# Patient Record
Sex: FEMALE | Race: BLACK OR AFRICAN AMERICAN | NOT HISPANIC OR LATINO | URBAN - METROPOLITAN AREA
[De-identification: names, ages, dates, MRNs, and addresses within clinical notes are randomized per-mention and may not be internally consistent; named-entity substitution may affect disease eponyms.]

---
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
## 2021-05-12 NOTE — ED PROVIDER NOTE - PMH
Mild intermittent asthma, unspecified whether complicated Consent 1/Introductory Paragraph: The rationale for Mohs was explained to the patient and consent was obtained. The risks, benefits and alternatives to therapy were discussed in detail. Specifically, the risks of infection, scarring, bleeding, prolonged wound healing, incomplete removal, allergy to anesthesia, nerve injury and recurrence were addressed. Prior to the procedure, the treatment site was clearly identified and confirmed by the patient. All components of Universal Protocol/PAUSE Rule completed.

## 2021-06-11 NOTE — ED PROVIDER NOTE - CPE EDP HEME LYMPH NORM
[FreeTextEntry1] : 47 y/o female with pmh significant for Type I Chiari malformation presents for a telephonic visit s/p fall and ED visit who is presenting with postconcussive symptoms.
normal...